# Patient Record
Sex: FEMALE | Race: WHITE
[De-identification: names, ages, dates, MRNs, and addresses within clinical notes are randomized per-mention and may not be internally consistent; named-entity substitution may affect disease eponyms.]

---

## 2021-07-08 ENCOUNTER — HOSPITAL ENCOUNTER (OUTPATIENT)
Dept: HOSPITAL 63 - RAD | Age: 41
End: 2021-07-08
Attending: NURSE PRACTITIONER
Payer: COMMERCIAL

## 2021-07-08 DIAGNOSIS — M25.531: Primary | ICD-10-CM

## 2021-07-08 PROCEDURE — 73110 X-RAY EXAM OF WRIST: CPT

## 2021-07-08 NOTE — RAD
EXAM: 3 views of the right wrist



DATE: 7/8/2021 7:23 PM



INDICATION: Reason: INJURY / Spl. Instructions:  / History: 



COMPARISON: No Prior



FINDINGS:

No acute fracture or dislocation. Joint spaces are preserved without significant degenerative/prolife
rative change. No significant soft tissue swelling. Ulnar minus variance. Lunate is normal in appeara
nce. No sclerosis, cystic change or collapse. Mild bowing of the pronator fat pad.



IMPRESSION:

1.  Mild bowing of the pronator fat pad however no definite acute fracture or dislocation. If there i
s persistent clinical concern for fracture, follow-up radiographs in 10-14 days is recommended.

2.  Ulnar minus variance with grossly normal appearance of the lunate.



Electronically signed by: Chad Forte MD (7/8/2021 8:10 PM) ANDER